# Patient Record
Sex: FEMALE | Race: WHITE | NOT HISPANIC OR LATINO | Employment: FULL TIME | URBAN - NONMETROPOLITAN AREA
[De-identification: names, ages, dates, MRNs, and addresses within clinical notes are randomized per-mention and may not be internally consistent; named-entity substitution may affect disease eponyms.]

---
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
## 2021-05-03 ENCOUNTER — APPOINTMENT (OUTPATIENT)
Dept: GENERAL RADIOLOGY | Facility: HOSPITAL | Age: 21
End: 2021-05-03

## 2021-05-03 ENCOUNTER — HOSPITAL ENCOUNTER (EMERGENCY)
Facility: HOSPITAL | Age: 21
Discharge: HOME | End: 2021-05-03
Attending: EMERGENCY MEDICINE | Admitting: EMERGENCY MEDICINE

## 2021-05-03 VITALS
DIASTOLIC BLOOD PRESSURE: 94 MMHG | WEIGHT: 250 LBS | RESPIRATION RATE: 18 BRPM | SYSTOLIC BLOOD PRESSURE: 152 MMHG | HEART RATE: 89 BPM | TEMPERATURE: 99 F | OXYGEN SATURATION: 100 % | BODY MASS INDEX: 44.3 KG/M2 | HEIGHT: 63 IN

## 2021-05-03 DIAGNOSIS — R07.9 CHEST PAIN, UNSPECIFIED TYPE: ICD-10-CM

## 2021-05-03 DIAGNOSIS — N30.00 ACUTE CYSTITIS WITHOUT HEMATURIA: Primary | ICD-10-CM

## 2021-05-03 LAB
ALBUMIN SERPL-MCNC: 4.45 G/DL (ref 3.5–5.2)
ALBUMIN/GLOB SERPL: 1.4 G/DL
ALP SERPL-CCNC: 73 U/L (ref 39–117)
ALT SERPL W P-5'-P-CCNC: 18 U/L (ref 1–33)
ANION GAP SERPL CALCULATED.3IONS-SCNC: 9 MMOL/L (ref 5–15)
AST SERPL-CCNC: 15 U/L (ref 1–32)
B-HCG UR QL: NEGATIVE
BACTERIA UR QL AUTO: ABNORMAL /HPF
BASOPHILS # BLD AUTO: 0.05 10*3/MM3 (ref 0–0.2)
BASOPHILS NFR BLD AUTO: 0.7 % (ref 0–1.5)
BILIRUB SERPL-MCNC: 0.2 MG/DL (ref 0–1.2)
BILIRUB UR QL STRIP: NEGATIVE
BUN SERPL-MCNC: 12 MG/DL (ref 6–20)
BUN/CREAT SERPL: 14.5 (ref 7–25)
CALCIUM SPEC-SCNC: 9.7 MG/DL (ref 8.6–10.5)
CHLORIDE SERPL-SCNC: 103 MMOL/L (ref 98–107)
CLARITY UR: CLEAR
CO2 SERPL-SCNC: 27 MMOL/L (ref 22–29)
COLOR UR: YELLOW
CREAT SERPL-MCNC: 0.83 MG/DL (ref 0.57–1)
CRP SERPL-MCNC: 0.84 MG/DL (ref 0–0.5)
DEPRECATED RDW RBC AUTO: 40.3 FL (ref 37–54)
EOSINOPHIL # BLD AUTO: 0.19 10*3/MM3 (ref 0–0.4)
EOSINOPHIL NFR BLD AUTO: 2.6 % (ref 0.3–6.2)
ERYTHROCYTE [DISTWIDTH] IN BLOOD BY AUTOMATED COUNT: 13.4 % (ref 12.3–15.4)
FLUAV RNA RESP QL NAA+PROBE: NOT DETECTED
FLUBV RNA RESP QL NAA+PROBE: NOT DETECTED
GFR SERPL CREATININE-BSD FRML MDRD: 88 ML/MIN/1.73
GLOBULIN UR ELPH-MCNC: 3.2 GM/DL
GLUCOSE SERPL-MCNC: 94 MG/DL (ref 65–99)
GLUCOSE UR STRIP-MCNC: NEGATIVE MG/DL
HCT VFR BLD AUTO: 39.5 % (ref 34–46.6)
HGB BLD-MCNC: 12.1 G/DL (ref 12–15.9)
HGB UR QL STRIP.AUTO: ABNORMAL
HOLD SPECIMEN: NORMAL
HOLD SPECIMEN: NORMAL
HYALINE CASTS UR QL AUTO: ABNORMAL /LPF
IMM GRANULOCYTES # BLD AUTO: 0.03 10*3/MM3 (ref 0–0.05)
IMM GRANULOCYTES NFR BLD AUTO: 0.4 % (ref 0–0.5)
KETONES UR QL STRIP: ABNORMAL
LEUKOCYTE ESTERASE UR QL STRIP.AUTO: ABNORMAL
LYMPHOCYTES # BLD AUTO: 1.72 10*3/MM3 (ref 0.7–3.1)
LYMPHOCYTES NFR BLD AUTO: 23.3 % (ref 19.6–45.3)
MCH RBC QN AUTO: 25.5 PG (ref 26.6–33)
MCHC RBC AUTO-ENTMCNC: 30.6 G/DL (ref 31.5–35.7)
MCV RBC AUTO: 83.2 FL (ref 79–97)
MONOCYTES # BLD AUTO: 0.44 10*3/MM3 (ref 0.1–0.9)
MONOCYTES NFR BLD AUTO: 6 % (ref 5–12)
NEUTROPHILS NFR BLD AUTO: 4.94 10*3/MM3 (ref 1.7–7)
NEUTROPHILS NFR BLD AUTO: 67 % (ref 42.7–76)
NITRITE UR QL STRIP: NEGATIVE
NRBC BLD AUTO-RTO: 0 /100 WBC (ref 0–0.2)
PH UR STRIP.AUTO: 6.5 [PH] (ref 5–8)
PLATELET # BLD AUTO: 320 10*3/MM3 (ref 140–450)
PMV BLD AUTO: 10.8 FL (ref 6–12)
POTASSIUM SERPL-SCNC: 4.2 MMOL/L (ref 3.5–5.2)
PROT SERPL-MCNC: 7.6 G/DL (ref 6–8.5)
PROT UR QL STRIP: NEGATIVE
RBC # BLD AUTO: 4.75 10*6/MM3 (ref 3.77–5.28)
RBC # UR: ABNORMAL /HPF
REF LAB TEST METHOD: ABNORMAL
SARS-COV-2 RNA RESP QL NAA+PROBE: NOT DETECTED
SODIUM SERPL-SCNC: 139 MMOL/L (ref 136–145)
SP GR UR STRIP: 1.03 (ref 1–1.03)
SQUAMOUS #/AREA URNS HPF: ABNORMAL /HPF
TROPONIN T SERPL-MCNC: <0.01 NG/ML (ref 0–0.03)
TSH SERPL DL<=0.05 MIU/L-ACNC: 4.15 UIU/ML (ref 0.27–4.2)
UROBILINOGEN UR QL STRIP: ABNORMAL
WBC # BLD AUTO: 7.37 10*3/MM3 (ref 3.4–10.8)
WBC UR QL AUTO: ABNORMAL /HPF
WHOLE BLOOD HOLD SPECIMEN: NORMAL
WHOLE BLOOD HOLD SPECIMEN: NORMAL

## 2021-05-03 PROCEDURE — 80053 COMPREHEN METABOLIC PANEL: CPT | Performed by: PHYSICIAN ASSISTANT

## 2021-05-03 PROCEDURE — 71045 X-RAY EXAM CHEST 1 VIEW: CPT | Performed by: RADIOLOGY

## 2021-05-03 PROCEDURE — 85025 COMPLETE CBC W/AUTO DIFF WBC: CPT | Performed by: PHYSICIAN ASSISTANT

## 2021-05-03 PROCEDURE — 84443 ASSAY THYROID STIM HORMONE: CPT | Performed by: PHYSICIAN ASSISTANT

## 2021-05-03 PROCEDURE — 99283 EMERGENCY DEPT VISIT LOW MDM: CPT

## 2021-05-03 PROCEDURE — 81001 URINALYSIS AUTO W/SCOPE: CPT | Performed by: PHYSICIAN ASSISTANT

## 2021-05-03 PROCEDURE — 25010000002 CEFTRIAXONE PER 250 MG: Performed by: PHYSICIAN ASSISTANT

## 2021-05-03 PROCEDURE — 81025 URINE PREGNANCY TEST: CPT | Performed by: PHYSICIAN ASSISTANT

## 2021-05-03 PROCEDURE — 86140 C-REACTIVE PROTEIN: CPT | Performed by: PHYSICIAN ASSISTANT

## 2021-05-03 PROCEDURE — 87636 SARSCOV2 & INF A&B AMP PRB: CPT | Performed by: EMERGENCY MEDICINE

## 2021-05-03 PROCEDURE — 93005 ELECTROCARDIOGRAM TRACING: CPT | Performed by: PHYSICIAN ASSISTANT

## 2021-05-03 PROCEDURE — 71045 X-RAY EXAM CHEST 1 VIEW: CPT

## 2021-05-03 PROCEDURE — 96372 THER/PROPH/DIAG INJ SC/IM: CPT

## 2021-05-03 PROCEDURE — 84484 ASSAY OF TROPONIN QUANT: CPT | Performed by: PHYSICIAN ASSISTANT

## 2021-05-03 RX ORDER — CEPHALEXIN 500 MG/1
500 CAPSULE ORAL 2 TIMES DAILY
Qty: 14 CAPSULE | Refills: 0 | Status: SHIPPED | OUTPATIENT
Start: 2021-05-03 | End: 2021-05-10

## 2021-05-03 RX ADMIN — LIDOCAINE HYDROCHLORIDE 1 G: 10 INJECTION, SOLUTION EPIDURAL; INFILTRATION; INTRACAUDAL; PERINEURAL at 16:57

## 2021-05-05 LAB
QT INTERVAL: 390 MS
QTC INTERVAL: 432 MS

## 2022-03-05 ENCOUNTER — HOSPITAL ENCOUNTER (EMERGENCY)
Facility: HOSPITAL | Age: 22
Discharge: HOME | End: 2022-03-05
Attending: EMERGENCY MEDICINE | Admitting: EMERGENCY MEDICINE

## 2022-03-05 ENCOUNTER — APPOINTMENT (OUTPATIENT)
Dept: GENERAL RADIOLOGY | Facility: HOSPITAL | Age: 22
End: 2022-03-05

## 2022-03-05 VITALS
HEIGHT: 63 IN | SYSTOLIC BLOOD PRESSURE: 122 MMHG | BODY MASS INDEX: 40.75 KG/M2 | RESPIRATION RATE: 16 BRPM | OXYGEN SATURATION: 99 % | DIASTOLIC BLOOD PRESSURE: 79 MMHG | HEART RATE: 96 BPM | TEMPERATURE: 98.2 F | WEIGHT: 230 LBS

## 2022-03-05 DIAGNOSIS — L23.7 POISON IVY: ICD-10-CM

## 2022-03-05 DIAGNOSIS — T14.8XXA ANIMAL BITES: Primary | ICD-10-CM

## 2022-03-05 PROCEDURE — 25010000002 TETANUS-DIPHTH-ACELL PERTUSSIS 5-2.5-18.5 LF-MCG/0.5 SUSPENSION PREFILLED SYRINGE: Performed by: PHYSICIAN ASSISTANT

## 2022-03-05 PROCEDURE — 99283 EMERGENCY DEPT VISIT LOW MDM: CPT

## 2022-03-05 PROCEDURE — 73130 X-RAY EXAM OF HAND: CPT

## 2022-03-05 PROCEDURE — 73130 X-RAY EXAM OF HAND: CPT | Performed by: RADIOLOGY

## 2022-03-05 PROCEDURE — 90715 TDAP VACCINE 7 YRS/> IM: CPT | Performed by: PHYSICIAN ASSISTANT

## 2022-03-05 PROCEDURE — 90471 IMMUNIZATION ADMIN: CPT | Performed by: PHYSICIAN ASSISTANT

## 2022-03-05 RX ORDER — AMOXICILLIN AND CLAVULANATE POTASSIUM 875; 125 MG/1; MG/1
1 TABLET, FILM COATED ORAL EVERY 12 HOURS
Qty: 20 TABLET | Refills: 0 | Status: SHIPPED | OUTPATIENT
Start: 2022-03-05

## 2022-03-05 RX ORDER — IBUPROFEN 600 MG/1
600 TABLET ORAL EVERY 6 HOURS PRN
Qty: 20 TABLET | Refills: 0 | Status: SHIPPED | OUTPATIENT
Start: 2022-03-05

## 2022-03-05 RX ORDER — TRIAMCINOLONE ACETONIDE 1 MG/G
1 CREAM TOPICAL 3 TIMES DAILY
Qty: 30 G | Refills: 0 | Status: SHIPPED | OUTPATIENT
Start: 2022-03-05

## 2022-03-05 RX ADMIN — IBUPROFEN 600 MG: 400 TABLET, FILM COATED ORAL at 10:04

## 2022-03-05 RX ADMIN — TETANUS TOXOID, REDUCED DIPHTHERIA TOXOID AND ACELLULAR PERTUSSIS VACCINE, ADSORBED 0.5 ML: 5; 2.5; 8; 8; 2.5 SUSPENSION INTRAMUSCULAR at 10:05

## 2022-03-05 NOTE — ED PROVIDER NOTES
Subjective   22-year-old white female presents secondary to dog bite.  Patient was at her home and her dog is in heat.  Patient had a unknown dog that she is never seen before, and attacked her dog.  She states that she was trying to separate these 2 dogs when she sustained a bite to her left hand and her right posterior calf.  She is not up-to-date on her tetanus shot.  She states that she is never seen this dog before.  She has no idea if it is up-to-date on shots and will likely never see this dog again.  The dog was vicious.  After reviewing Dr James.  He feels that it is safest if patient pursues with rabies shots.          Review of Systems   Constitutional: Negative.  Negative for fever.   HENT: Negative.    Respiratory: Negative.    Cardiovascular: Negative.  Negative for chest pain.   Gastrointestinal: Negative.  Negative for abdominal pain.   Endocrine: Negative.    Genitourinary: Negative.  Negative for dysuria.   Skin: Negative.    Neurological: Negative.    Psychiatric/Behavioral: Negative.    All other systems reviewed and are negative.      No past medical history on file.    No Known Allergies    No past surgical history on file.    No family history on file.    Social History     Socioeconomic History   • Marital status: Single           Objective   Physical Exam  Vitals and nursing note reviewed.   Constitutional:       General: She is not in acute distress.     Appearance: She is well-developed. She is not diaphoretic.   HENT:      Head: Normocephalic and atraumatic.      Right Ear: External ear normal.      Left Ear: External ear normal.      Nose: Nose normal.   Eyes:      Conjunctiva/sclera: Conjunctivae normal.      Pupils: Pupils are equal, round, and reactive to light.   Neck:      Vascular: No JVD.      Trachea: No tracheal deviation.   Cardiovascular:      Rate and Rhythm: Normal rate and regular rhythm.      Heart sounds: Normal heart sounds. No murmur heard.  Pulmonary:      Effort:  Pulmonary effort is normal. No respiratory distress.      Breath sounds: Normal breath sounds. No wheezing.   Abdominal:      General: Bowel sounds are normal.      Palpations: Abdomen is soft.      Tenderness: There is no abdominal tenderness.   Musculoskeletal:         General: No deformity. Normal range of motion.      Cervical back: Normal range of motion and neck supple.   Skin:     General: Skin is warm and dry.      Coloration: Skin is not pale.      Findings: No erythema or rash.      Comments: 2 small puncture wounds to patient's dorsal left hand.  These are less than 1/2 cm each.  She does have some swelling to this area.  Patient also has a laceration/tear that is approximately 1 cm long superficial on her right posterior calf.  No active bleeding from any sites.  States this area on her leg is not as tender as her hand.   Neurological:      Mental Status: She is alert and oriented to person, place, and time.      Cranial Nerves: No cranial nerve deficit.   Psychiatric:         Behavior: Behavior normal.         Thought Content: Thought content normal.         Procedures           ED Course  ED Course as of 03/05/22 1420   Sat Mar 05, 2022   1107 I had ordered rabies vaccines based on the fact that this was an unknown dog's vaccination status is unknown.  Patient has refused.  She has been counseled that rabies is potentially deadly.  She was counseled that she should follow-up with the  to try to find this dog so it can be monitored.  She was made aware that rabies is a deadly disease.  She wishes to forego the shots at this time.  She is awake alert and oriented capable of making this decision. [JI]      ED Course User Index  [JI] Dylan Kay PA                                                 MDM  Number of Diagnoses or Management Options  Animal bites: new and requires workup  Poison ivy: new and does not require workup     Amount and/or Complexity of Data Reviewed  Tests in  the radiology section of CPT®: ordered and reviewed        Final diagnoses:   Animal bites   Poison ivy       ED Disposition  ED Disposition     ED Disposition   Discharge    Condition   Stable    Comment   --             Dylan Bojorquez PA  [address] [facility] [address]  [phone]    In 2 days      [facility] Emergency Department  [address] [facility] [id number] [phone]    If symptoms worsen         Medication List      New Prescriptions    amoxicillin-clavulanate 875-125 MG per tablet  Commonly known as: AUGMENTIN  Take 1 tablet by mouth Every 12 (Twelve) Hours.     ibuprofen 600 MG tablet  Commonly known as: ADVIL,MOTRIN  Take 1 tablet by mouth Every 6 (Six) Hours As Needed for Mild Pain .     triamcinolone 0.1 % cream  Commonly known as: KENALOG  Apply 1 application topically to the appropriate area as directed 3 (Three) Times a Day.        Stop    hydrocortisone 1 % lotion           Where to Get Your Medications      You can get these medications from any pharmacy    Bring a paper prescription for each of these medications  · amoxicillin-clavulanate 875-125 MG per tablet  · ibuprofen 600 MG tablet  · triamcinolone 0.1 % cream          Dylan Kay PA  03/05/22 7965

## 2022-03-05 NOTE — ED NOTES
MEDICAL SCREENING:    Reason for Visit: Dog bite to left hand and right lower leg.    Patient initially seen in triage.  The patient was advised further evaluation and diagnostic testing will be needed, some of the treatment and testing will be initiated in the lobby in order to begin the process.  The patient will be returned to the waiting area for the time being and possibly be re-assessed by a subsequent ED provider.  The patient will be brought back to the treatment area in as timely manner as possible.         Dylan Kay PA  03/05/22 0818

## 2022-03-05 NOTE — ED NOTES
"WOUND DRESSING: Wounds on both front/back of left hand and back of right leg cleaned and debrided with Hibiclens and saline solution. Wound dressed with nonadherent pad and 3\" Cira wrap. Patient advised to keep wound covered for a period of 24 hours; patient advised to keep wound clean with warm soapy water and to apply neosporin as needed.      Evan Olmedo, PCT  03/05/22 1117    "

## 2022-03-05 NOTE — EXTERNAL PATIENT INSTRUCTIONS
Patient Education   Table of Contents       Animal Bite, Adult       Poison Ivy Dermatitis       Rabies     To view videos and all your education online visit,   [email or web address]   or scan this QR code with your smartphone.                  Animal Bite, Adult     Animal bites range from mild to serious. An animal bite can result in any of these injuries:       A scratch.       A deep, open cut.       A puncture of the skin.       A crush injury.       Tearing away of the skin or a body part.       A bone injury.     A small bite from a house pet is usually less serious than a bite from a stray or wild animal, such as a raccoon, yañez, skunk, or bat. That is because stray and wild animals have a higher risk of carrying a serious infection called rabies, which can be passed to humans through a bite.   What increases the risk?    You are more likely to be bitten by an animal if:       You are around unfamiliar pets.       You disturb an animal when it is eating, sleeping, or caring for its babies.       You are outdoors in a place where small, wild animals roam freely.     What are the signs or symptoms?    Common symptoms of an animal bite include:       Pain.       Bleeding.       Swelling.       Bruising.     How is this diagnosed?    This condition may be diagnosed based on a physical exam and medical history. Your health care provider will examine your wound and ask for details about the animal and how the bite happened. You may also have tests, such as:       Blood tests to check for infection.       X-rays to check for damage to bones or joints.       Taking a fluid sample from your wound and checking it for infection (culture test).     How is this treated?    Treatment varies depending on the type of animal, where the bite is on your body, and your medical history. Treatment may include:       Caring for the wound. This often includes cleaning the wound, rinsing out (flushing) the wound with  saline solution, and applying a bandage (dressing). In some cases, the wound may be closed with stitches (sutures), staples, skin glue, or adhesive strips.       Antibiotic medicine to prevent or treat infection. This medicine may be prescribed in pill or ointment form. If the bite area becomes infected, the medicine may be given through an IV.       A tetanus shot to prevent tetanus infection.       Rabies treatment to prevent rabies infection. This will be done if the animal could have rabies.       Surgery. This may be done if a bite gets infected or if there is damage that needs to be repaired.     Follow these instructions at home:   Wound care           Follow instructions from your health care provider about how to take care of your wound. Make sure you:       Wash your hands with soap and water before you change your dressing. If soap and water are not available, use hand .       Change your dressing as told by your health care provider.       Leave sutures, skin glue, or adhesive strips in place. These skin closures may need to stay in place for 2 weeks or longer. If adhesive strip edges start to loosen and curl up, you may trim the loose edges. Do not  remove adhesive strips completely unless your health care provider tells you to do that.      Check your wound every day for signs of infection. Check for:       More redness, swelling, or pain.       More fluid or blood.       Warmth.       Pus or a bad smell.     Medicines         Take or apply over-the-counter and prescription medicines only as told by your health care provider.       If you were prescribed an antibiotic, take or apply it as told by your health care provider. Do not  stop using the antibiotic even if your condition improves.     General instructions            Keep the injured area raised (elevated) above the level of your heart while you are sitting or lying down, if this is possible.      If directed, put ice on the injured  area.       Put ice in a plastic bag.       Place a towel between your skin and the bag.       Leave the ice on for 20 minutes, 2?3 times per day.       Keep all follow-up visits as told by your health care provider. This is important.         Contact a health care provider if:         You have more redness, swelling, or pain around your wound.       Your wound feels warm to the touch.       You have a fever or chills.       You have a general feeling of sickness (malaise).       You feel nauseous or you vomit.       You have pain that does not get better.     Get help right away if:         You have a red streak that leads away from your wound.       You have non-clear fluid or more blood coming from your wound.       There is pus or a bad smell coming from your wound.       You have trouble moving your injured area.       You have numbness or tingling that extends beyond the wound.     Summary         Animal bites can range from mild to serious. An animal bite can cause a scratch on the skin, a deep open cut, a puncture of the skin, a crush injury, tearing away of the skin or a body part, or a bone injury.       Your health care provider will examine your wound and ask for details about the animal and how the bite happened.       You may also have tests such as a blood test, X-ray, or testing of a fluid sample from your wound (culture test).       Treatment may include wound care, antibiotic medicine, a tetanus shot, and rabies treatment if the animal could have rabies.     This information is not intended to replace advice given to you by your health care provider. Make sure you discuss any questions you have with your health care provider.     Document Released: 09/04/2012Document Revised: 10/12/2021Document Reviewed: 10/12/2021     Elsevier Patient Education ? 2021 [employer]         Poison Ivy Dermatitis     Poison ivy dermatitis is inflammation of the skin that is caused by chemicals in the leaves of the  poison ivy plant. The skin reaction often involves redness, swelling, blisters, and extreme itching.   What are the causes?    This condition is caused by a chemical (urushiol) found in the sap of the poison ivy plant. This chemical is sticky and can be easily spread to people, animals, and objects. You can get poison ivy dermatitis by:       Having direct contact with a poison ivy plant.       Touching animals, other people, or objects that have come in contact with poison ivy and have the chemical on them.     What increases the risk?    This condition is more likely to develop in people who:       Are outdoors often in wooded or marshy areas.       Go outdoors without wearing protective clothing, such as closed shoes, long pants, and a long-sleeved shirt.     What are the signs or symptoms?       Symptoms of this condition include:       Redness of the skin.       Extreme itching.       A rash that often includes bumps and blisters. The rash usually appears 48 hours after exposure, if you have been exposed before. If this is the first time you have been exposed, the rash may not appear until a week after exposure.       Swelling. This may occur if the reaction is more severe.     Symptoms usually last for 1?2 weeks. However, the first time you develop this condition, symptoms may last 3?4 weeks.     How is this diagnosed?   This condition may be diagnosed based on your symptoms and a physical exam. Your health care provider may also ask you about any recent outdoor activity.   How is this treated?    Treatment for this condition will vary depending on how severe it is. Treatment may include:       Hydrocortisone cream or calamine lotion to relieve itching.       Oatmeal baths to soothe the skin.       Medicines, such as over-the-counter antihistamine tablets.       Oral steroid medicine, for more severe reactions.     Follow these instructions at home:   Medicines         Take or apply over-the-counter and  prescription medicines only as told by your health care provider.       Use hydrocortisone cream or calamine lotion as needed to soothe the skin and relieve itching.     General instructions        Do not  scratch or rub your skin.       Apply a cold, wet cloth (cold compress) to the affected areas or take baths in cool water. This will help with itching. Avoid hot baths and showers.       Take oatmeal baths as needed. Use colloidal oatmeal. You can get this at your local pharmacy or grocery store. Follow the instructions on the packaging.       While you have the rash, wash clothes right after you wear them.       Keep all follow-up visits as told by your health care provider. This is important.     How is this prevented?            Learn to identify the poison ivy plant and avoid contact with the plant. This plant can be recognized by the number of leaves. Generally, poison ivy has three leaves with flowering branches on a single stem. The leaves are typically glossy, and they have jagged edges that come to a point at the front.       If you have been exposed to poison ivy, thoroughly wash with soap and water right away. You have about 30 minutes to remove the plant resin before it will cause the rash. Be sure to wash under your fingernails, because any plant resin there will continue to spread the rash.       When hiking or camping, wear clothes that will help you to avoid exposure on the skin. This includes long pants, a long-sleeved shirt, tall socks, and hiking boots. You can also apply preventive lotion to your skin to help limit exposure.       If you suspect that your clothes or outdoor gear came in contact with poison ivy, rinse them off outside with a garden hose before you bring them inside your house.       When doing yard work or gardening, wear gloves, long sleeves, long pants, and boots. Wash your garden tools and gloves if they come in contact with poison ivy.       If you suspect that your pet has  come into contact with poison ivy, wash him or her with pet shampoo and water. Make sure to wear gloves while washing your pet.       Contact a health care provider if you have:         Open sores in the rash area.       More redness, swelling, or pain in the affected area.       Redness that spreads beyond the rash area.       Fluid, blood, or pus coming from the affected area.       A fever.       A rash over a large area of your body.       A rash on your eyes, mouth, or genitals.       A rash that does not improve after a few weeks.     Get help right away if:         Your face swells or your eyes swell shut.       You have trouble breathing.       You have trouble swallowing.     These symptoms may represent a serious problem that is an emergency. Do not wait to see if the symptoms will go away. Get medical help right away. Call your local emergency services (911 in the [place] Do not drive yourself to the hospital.   Summary         Poison ivy dermatitis is inflammation of the skin that is caused by chemicals in the leaves of the poison ivy plant.       Symptoms of this condition include redness, itching, a rash, and swelling.      Do not  scratch or rub your skin.       Take or apply over-the-counter and prescription medicines only as told by your health care provider.     This information is not intended to replace advice given to you by your health care provider. Make sure you discuss any questions you have with your health care provider.     Document Released: 2001Document Revised: 04/10/2020Document Reviewed: 12/13/2019     Elsevier Patient Education ? 2021 [employer]         Rabies     Rabies is an infection that affects the brain and central nervous system. It is caused by a virus that can be carried by many kinds of animals. The virus can spread from an infected animal to a person through a bite.   If you have been bitten by an animal with rabies, it is very important that you get treatment  right away. Infection almost always results in death, but early treatment may prevent an infection from developing.   What are the causes?   This condition is caused by a virus that can be carried by many kinds of animals, including dogs, cats, skunks, bats, woodchucks, raccoons, coyotes, and foxes. The virus spreads through the saliva of infected animals. Most people who get rabies get it from an animal bite.   What are the signs or symptoms?    Symptoms of this condition usually start 1?3 months after you are bitten. By the time symptoms start, it is usually too late for lifesaving treatment. Symptoms may include:       Headache.       Fever.       Fatigue and weakness.       Agitation.       Anxiety.       Confusion.       Unusual behavior, such as hyperactivity, fear of water (hydrophobia), or fear of air (aerophobia).       Hallucinations.       Insomnia.       Weakness in the arms or legs.       Difficulty swallowing.     Most people who are treated right away will never have symptoms.   How is this diagnosed?    This condition may be diagnosed based on:       Your history of exposure to animals and animal bites.       Your symptoms.       Saliva tests.       Blood tests.       Skin test. Skin samples are taken with a needle.       Spinal fluid test. Spinal fluid samples are taken with a needle that is inserted into your back (lumbar puncture).     How is this treated?       Treatment is often started right away, even if it is not known for sure if the animal that bit you has rabies. Treatment, called post-exposure prophylaxis (PEP), aims to prevent the infection from developing. It involves:       Cleaning the wound.       Getting an injection to strengthen your body's defense against the rabies virus (immune globulin).       Having a series of rabies vaccine injections, usually given over a 2-week period.     If the animal that bit you has been caught and is alive, it will be watched to see if it remains  healthy. If the animal has been killed, it can be tested for rabies.     Follow these instructions at home:   Caring for your injury           Follow instructions from your health care provider about how to take care of your wound. Make sure you:       Wash your hands with soap and water before you change your bandage (dressing). If soap and water are not available, use hand .       Change your dressing as told by your health care provider.       Leave stitches (sutures), skin glue, or adhesive strips in place. These skin closures may need to stay in place for 2 weeks or longer. If adhesive strip edges start to loosen and curl up, you may trim the loose edges. Do not  remove adhesive strips completely unless your health care provider tells you to do that.      Check your wound every day for signs of infection. Check for:       More redness, swelling, or pain.       Fluid or blood.       Pus or a bad smell.       Warmth.       Keep the wound dry for as long as told by your health care provider.       Keep the wound raised (elevated) above the level of your heart as much as possible.       Rest the injured area. Do not  use the injured area until your health care provider says it is okay.     General instructions         If the animal that bit you was tested for rabies, ask your health care provider or the department performing the test, when the test results will be ready. It is your responsibility to get the test results.       Take over-the-counter and prescription medicines only as told by your health care provider.       Keep all follow-up visits as told by your health care provider. This is important.       How is this prevented?         Stay away from stray or wild animals.      Get the rabies vaccine if you:       Plan to travel to an area where rabies is common.       Have a job or hobbies that involve possible contact with wild or stray animals.       Make sure your pet stays up to date with rabies  vaccinations.       Watch your pets when they are outside. Keep them away from wild animals.       Report any stray animals to the local animal control services.     Get help right away if you:         Are bitten by a wild or stray animal.       Have had any direct exposure to a bat.       Have any symptoms of rabies infection.      Have signs that your wound is infected, including:       More redness, swelling, or pain around your wound.       Fluid or blood coming from your wound.       Pus or a bad smell coming from your wound.       Your wound feels warm to the touch.     Summary         Rabies is an infection that affects the brain and central nervous system.       This condition is caused by a virus that can be carried by many kinds of animals, including dogs, cats, skunks, bats, woodchucks, raccoons, coyotes, and foxes.       The virus can spread from an infected animal's saliva to a person through a bite.       If you are bitten, get treatment right away. This may prevent an infection from developing. Symptoms of an infection usually do not start until 1?3 months after you are bitten. By then it may be too late for lifesaving treatment.     This information is not intended to replace advice given to you by your health care provider. Make sure you discuss any questions you have with your health care provider.     Document Released: 12/18/2006Document Revised: 01/23/2019Document Reviewed: 01/23/2019     [employer] Patient Education ? 2021 [email or web address]

## 2023-08-17 ENCOUNTER — APPOINTMENT (OUTPATIENT)
Dept: GENERAL RADIOLOGY | Facility: HOSPITAL | Age: 23
End: 2023-08-17

## 2023-08-17 ENCOUNTER — APPOINTMENT (OUTPATIENT)
Dept: CT IMAGING | Facility: HOSPITAL | Age: 23
End: 2023-08-17

## 2023-08-17 ENCOUNTER — HOSPITAL ENCOUNTER (EMERGENCY)
Facility: HOSPITAL | Age: 23
Discharge: HOME | End: 2023-08-17
Attending: STUDENT IN AN ORGANIZED HEALTH CARE EDUCATION/TRAINING PROGRAM

## 2023-08-17 VITALS
SYSTOLIC BLOOD PRESSURE: 146 MMHG | HEIGHT: 63 IN | HEART RATE: 70 BPM | DIASTOLIC BLOOD PRESSURE: 70 MMHG | TEMPERATURE: 98.6 F | OXYGEN SATURATION: 99 % | RESPIRATION RATE: 18 BRPM | WEIGHT: 250 LBS | BODY MASS INDEX: 44.3 KG/M2

## 2023-08-17 DIAGNOSIS — R42 DIZZINESS: Primary | ICD-10-CM

## 2023-08-17 LAB
ALBUMIN SERPL-MCNC: 4.4 G/DL (ref 3.5–5.2)
ALBUMIN/GLOB SERPL: 1.4 G/DL
ALP SERPL-CCNC: 73 U/L (ref 39–117)
ALT SERPL W P-5'-P-CCNC: 15 U/L (ref 1–33)
ANION GAP SERPL CALCULATED.3IONS-SCNC: 10 MMOL/L (ref 5–15)
AST SERPL-CCNC: 13 U/L (ref 1–32)
B-HCG UR QL: NEGATIVE
BACTERIA UR QL AUTO: ABNORMAL /HPF
BASOPHILS # BLD AUTO: 0.04 10*3/MM3 (ref 0–0.2)
BASOPHILS NFR BLD AUTO: 0.5 % (ref 0–1.5)
BILIRUB SERPL-MCNC: 0.3 MG/DL (ref 0–1.2)
BILIRUB UR QL STRIP: NEGATIVE
BUN SERPL-MCNC: 14 MG/DL (ref 6–20)
BUN/CREAT SERPL: 16.5 (ref 7–25)
CALCIUM SPEC-SCNC: 9.6 MG/DL (ref 8.6–10.5)
CHLORIDE SERPL-SCNC: 102 MMOL/L (ref 98–107)
CLARITY UR: ABNORMAL
CO2 SERPL-SCNC: 24 MMOL/L (ref 22–29)
COLOR UR: YELLOW
CREAT SERPL-MCNC: 0.85 MG/DL (ref 0.57–1)
DEPRECATED RDW RBC AUTO: 41.1 FL (ref 37–54)
EGFRCR SERPLBLD CKD-EPI 2021: 99.5 ML/MIN/1.73
EOSINOPHIL # BLD AUTO: 0.41 10*3/MM3 (ref 0–0.4)
EOSINOPHIL NFR BLD AUTO: 5 % (ref 0.3–6.2)
ERYTHROCYTE [DISTWIDTH] IN BLOOD BY AUTOMATED COUNT: 13.5 % (ref 12.3–15.4)
GLOBULIN UR ELPH-MCNC: 3.2 GM/DL
GLUCOSE SERPL-MCNC: 99 MG/DL (ref 65–99)
GLUCOSE UR STRIP-MCNC: NEGATIVE MG/DL
HCT VFR BLD AUTO: 39.9 % (ref 34–46.6)
HGB BLD-MCNC: 12.2 G/DL (ref 12–15.9)
HGB UR QL STRIP.AUTO: NEGATIVE
HOLD SPECIMEN: NORMAL
HOLD SPECIMEN: NORMAL
HYALINE CASTS UR QL AUTO: ABNORMAL /LPF
IMM GRANULOCYTES # BLD AUTO: 0.03 10*3/MM3 (ref 0–0.05)
IMM GRANULOCYTES NFR BLD AUTO: 0.4 % (ref 0–0.5)
KETONES UR QL STRIP: NEGATIVE
LEUKOCYTE ESTERASE UR QL STRIP.AUTO: ABNORMAL
LYMPHOCYTES # BLD AUTO: 1.84 10*3/MM3 (ref 0.7–3.1)
LYMPHOCYTES NFR BLD AUTO: 22.3 % (ref 19.6–45.3)
MAGNESIUM SERPL-MCNC: 2.2 MG/DL (ref 1.6–2.6)
MCH RBC QN AUTO: 25.6 PG (ref 26.6–33)
MCHC RBC AUTO-ENTMCNC: 30.6 G/DL (ref 31.5–35.7)
MCV RBC AUTO: 83.6 FL (ref 79–97)
MONOCYTES # BLD AUTO: 0.55 10*3/MM3 (ref 0.1–0.9)
MONOCYTES NFR BLD AUTO: 6.7 % (ref 5–12)
NEUTROPHILS NFR BLD AUTO: 5.37 10*3/MM3 (ref 1.7–7)
NEUTROPHILS NFR BLD AUTO: 65.1 % (ref 42.7–76)
NITRITE UR QL STRIP: NEGATIVE
NRBC BLD AUTO-RTO: 0 /100 WBC (ref 0–0.2)
PH UR STRIP.AUTO: 6 [PH] (ref 5–8)
PLATELET # BLD AUTO: 292 10*3/MM3 (ref 140–450)
PMV BLD AUTO: 11.6 FL (ref 6–12)
POTASSIUM SERPL-SCNC: 4.2 MMOL/L (ref 3.5–5.2)
PROT SERPL-MCNC: 7.6 G/DL (ref 6–8.5)
PROT UR QL STRIP: NEGATIVE
QT INTERVAL: 378 MS
QTC INTERVAL: 408 MS
RBC # BLD AUTO: 4.77 10*6/MM3 (ref 3.77–5.28)
RBC # UR STRIP: ABNORMAL /HPF
REF LAB TEST METHOD: ABNORMAL
SODIUM SERPL-SCNC: 136 MMOL/L (ref 136–145)
SP GR UR STRIP: 1.02 (ref 1–1.03)
SQUAMOUS #/AREA URNS HPF: ABNORMAL /HPF
TROPONIN T SERPL HS-MCNC: <6 NG/L
UROBILINOGEN UR QL STRIP: ABNORMAL
WBC # UR STRIP: ABNORMAL /HPF
WBC NRBC COR # BLD: 8.24 10*3/MM3 (ref 3.4–10.8)
WHOLE BLOOD HOLD COAG: NORMAL
WHOLE BLOOD HOLD SPECIMEN: NORMAL

## 2023-08-17 PROCEDURE — 85025 COMPLETE CBC W/AUTO DIFF WBC: CPT | Performed by: PHYSICIAN ASSISTANT

## 2023-08-17 PROCEDURE — 80053 COMPREHEN METABOLIC PANEL: CPT | Performed by: PHYSICIAN ASSISTANT

## 2023-08-17 PROCEDURE — 71045 X-RAY EXAM CHEST 1 VIEW: CPT | Performed by: RADIOLOGY

## 2023-08-17 PROCEDURE — 83735 ASSAY OF MAGNESIUM: CPT | Performed by: PHYSICIAN ASSISTANT

## 2023-08-17 PROCEDURE — 93005 ELECTROCARDIOGRAM TRACING: CPT | Performed by: PHYSICIAN ASSISTANT

## 2023-08-17 PROCEDURE — 84484 ASSAY OF TROPONIN QUANT: CPT | Performed by: PHYSICIAN ASSISTANT

## 2023-08-17 PROCEDURE — 70450 CT HEAD/BRAIN W/O DYE: CPT | Performed by: RADIOLOGY

## 2023-08-17 PROCEDURE — 99284 EMERGENCY DEPT VISIT MOD MDM: CPT

## 2023-08-17 PROCEDURE — 81025 URINE PREGNANCY TEST: CPT | Performed by: PHYSICIAN ASSISTANT

## 2023-08-17 PROCEDURE — 93010 ELECTROCARDIOGRAM REPORT: CPT | Performed by: INTERNAL MEDICINE

## 2023-08-17 PROCEDURE — 70450 CT HEAD/BRAIN W/O DYE: CPT

## 2023-08-17 PROCEDURE — 36415 COLL VENOUS BLD VENIPUNCTURE: CPT

## 2023-08-17 PROCEDURE — 81001 URINALYSIS AUTO W/SCOPE: CPT | Performed by: PHYSICIAN ASSISTANT

## 2023-08-17 PROCEDURE — 71045 X-RAY EXAM CHEST 1 VIEW: CPT

## 2023-08-17 RX ORDER — SODIUM CHLORIDE 0.9 % (FLUSH) 0.9 %
10 SYRINGE (ML) INJECTION AS NEEDED
Status: DISCONTINUED | OUTPATIENT
Start: 2023-08-17 | End: 2023-08-17 | Stop reason: HOSPADM

## 2023-08-17 NOTE — ED PROVIDER NOTES
Subjective   History of Present Illness  This is a 23 year old female patient who presents to the ER with chief complaint of dizziness. PMH significant for migraines. For the past 3 days, the patient has had intermittent episodes of head spinning dizziness. She denies associated headache, numbness/tingling, chest pain and SOB.     Review of Systems   Constitutional: Negative.  Negative for fever.   HENT: Negative.     Respiratory: Negative.     Cardiovascular: Negative.  Negative for chest pain.   Gastrointestinal: Negative.  Negative for abdominal pain.   Endocrine: Negative.    Genitourinary: Negative.  Negative for dysuria.   Skin: Negative.    Neurological:  Positive for dizziness. Negative for tremors, seizures, syncope, facial asymmetry, speech difficulty, weakness, light-headedness, numbness and headaches.   Psychiatric/Behavioral: Negative.     All other systems reviewed and are negative.    No past medical history on file.    No Known Allergies    No past surgical history on file.    No family history on file.    Social History     Socioeconomic History    Marital status: Single           Objective   Physical Exam  Vitals and nursing note reviewed.   Constitutional:       General: She is not in acute distress.     Appearance: She is well-developed. She is not diaphoretic.   HENT:      Head: Normocephalic and atraumatic.      Right Ear: External ear normal.      Left Ear: External ear normal.      Nose: Nose normal.   Eyes:      Conjunctiva/sclera: Conjunctivae normal.      Pupils: Pupils are equal, round, and reactive to light.   Neck:      Vascular: No JVD.      Trachea: No tracheal deviation.   Cardiovascular:      Rate and Rhythm: Normal rate and regular rhythm.      Heart sounds: Normal heart sounds. No murmur heard.  Pulmonary:      Effort: Pulmonary effort is normal. No respiratory distress.      Breath sounds: Normal breath sounds. No wheezing.   Abdominal:      General: Bowel sounds are normal.       Palpations: Abdomen is soft.      Tenderness: There is no abdominal tenderness.   Musculoskeletal:         General: No deformity. Normal range of motion.      Cervical back: Normal range of motion and neck supple.   Skin:     General: Skin is warm and dry.      Coloration: Skin is not pale.      Findings: No erythema or rash.   Neurological:      General: No focal deficit present.      Mental Status: She is alert and oriented to person, place, and time. Mental status is at baseline.      Cranial Nerves: No cranial nerve deficit.      Sensory: No sensory deficit.      Motor: No weakness.      Coordination: Coordination normal.      Gait: Gait normal.      Deep Tendon Reflexes: Reflexes normal.   Psychiatric:         Behavior: Behavior normal.         Thought Content: Thought content normal.       Procedures       Results for orders placed or performed during the hospital encounter of 08/17/23   Comprehensive Metabolic Panel    Specimen: Blood   Result Value Ref Range    Glucose 99 65 - 99 mg/dL    BUN 14 6 - 20 mg/dL    Creatinine 0.85 0.57 - 1.00 mg/dL    Sodium 136 136 - 145 mmol/L    Potassium 4.2 3.5 - 5.2 mmol/L    Chloride 102 98 - 107 mmol/L    CO2 24.0 22.0 - 29.0 mmol/L    Calcium 9.6 8.6 - 10.5 mg/dL    Total Protein 7.6 6.0 - 8.5 g/dL    Albumin 4.4 3.5 - 5.2 g/dL    ALT (SGPT) 15 1 - 33 U/L    AST (SGOT) 13 1 - 32 U/L    Alkaline Phosphatase 73 39 - 117 U/L    Total Bilirubin 0.3 0.0 - 1.2 mg/dL    Globulin 3.2 gm/dL    A/G Ratio 1.4 g/dL    BUN/Creatinine Ratio 16.5 7.0 - 25.0    Anion Gap 10.0 5.0 - 15.0 mmol/L    eGFR 99.5 >60.0 mL/min/1.73   Pregnancy, Urine - Urine, Clean Catch    Specimen: Urine, Clean Catch   Result Value Ref Range    HCG, Urine QL Negative Negative   Urinalysis With Microscopic If Indicated (No Culture) - Urine, Clean Catch    Specimen: Urine, Clean Catch   Result Value Ref Range    Color, UA Yellow Yellow, Straw    Appearance, UA Cloudy (A) Clear    pH, UA 6.0 5.0 - 8.0     Specific Gravity, UA 1.021 1.005 - 1.030    Glucose, UA Negative Negative    Ketones, UA Negative Negative    Bilirubin, UA Negative Negative    Blood, UA Negative Negative    Protein, UA Negative Negative    Leuk Esterase, UA Small (1+) (A) Negative    Nitrite, UA Negative Negative    Urobilinogen, UA 0.2 E.U./dL 0.2 - 1.0 E.U./dL   Magnesium    Specimen: Blood   Result Value Ref Range    Magnesium 2.2 1.6 - 2.6 mg/dL   CBC Auto Differential    Specimen: Blood   Result Value Ref Range    WBC 8.24 3.40 - 10.80 10*3/mm3    RBC 4.77 3.77 - 5.28 10*6/mm3    Hemoglobin 12.2 12.0 - 15.9 g/dL    Hematocrit 39.9 34.0 - 46.6 %    MCV 83.6 79.0 - 97.0 fL    MCH 25.6 (L) 26.6 - 33.0 pg    MCHC 30.6 (L) 31.5 - 35.7 g/dL    RDW 13.5 12.3 - 15.4 %    RDW-SD 41.1 37.0 - 54.0 fl    MPV 11.6 6.0 - 12.0 fL    Platelets 292 140 - 450 10*3/mm3    Neutrophil % 65.1 42.7 - 76.0 %    Lymphocyte % 22.3 19.6 - 45.3 %    Monocyte % 6.7 5.0 - 12.0 %    Eosinophil % 5.0 0.3 - 6.2 %    Basophil % 0.5 0.0 - 1.5 %    Immature Grans % 0.4 0.0 - 0.5 %    Neutrophils, Absolute 5.37 1.70 - 7.00 10*3/mm3    Lymphocytes, Absolute 1.84 0.70 - 3.10 10*3/mm3    Monocytes, Absolute 0.55 0.10 - 0.90 10*3/mm3    Eosinophils, Absolute 0.41 (H) 0.00 - 0.40 10*3/mm3    Basophils, Absolute 0.04 0.00 - 0.20 10*3/mm3    Immature Grans, Absolute 0.03 0.00 - 0.05 10*3/mm3    nRBC 0.0 0.0 - 0.2 /100 WBC   Single High Sensitivity Troponin T    Specimen: Blood   Result Value Ref Range    HS Troponin T <6 <10 ng/L   Urinalysis, Microscopic Only - Urine, Clean Catch    Specimen: Urine, Clean Catch   Result Value Ref Range    RBC, UA 0-2 None Seen, 0-2 /HPF    WBC, UA 6-12 (A) None Seen, 0-2 /HPF    Bacteria, UA None Seen None Seen /HPF    Squamous Epithelial Cells, UA 0-2 None Seen, 0-2 /HPF    Hyaline Casts, UA None Seen None Seen /LPF    Methodology Automated Microscopy    ECG 12 Lead Other; dizziness   Result Value Ref Range    QT Interval 378 ms    QTC Interval 408  ms   Green Top (Gel)   Result Value Ref Range    Extra Tube Hold for add-ons.    Lavender Top   Result Value Ref Range    Extra Tube hold for add-on    Gold Top - SST   Result Value Ref Range    Extra Tube Hold for add-ons.    Light Blue Top   Result Value Ref Range    Extra Tube Hold for add-ons.           ED Course  ED Course as of 08/17/23 1331   Thu Aug 17, 2023   1130 ECG 12 Lead Other; dizziness  Sinus rhythm anterior lateral infarct of undetermined age.  No acute ST deviations.  Rate 70  QRS 76 QTc 408  Electronically signed by Emily Monroe DO, 08/17/23, 11:41 AM EDT.   [LK]   1222 Patient refused IV and IV fluids at this time.  [MM]   1303 XR Chest 1 View  IMPRESSION:    Unremarkable exam. No acute cardiopulmonary findings identified.     This report was finalized on 8/17/2023 12:51 PM by Dr. James Reynoso MD   [MM]   1312 CT Head Without Contrast  IMPRESSION:    Unremarkable exam demonstrating no CT evidence of acute intracranial  findings.     This report was finalized on 8/17/2023 1:09 PM by Dr. James Reynoso MD   [MM]   1329 Patient diagnosed with dizziness. Will be d/c home with instructions to drink lots of water. Will f/u with PCP in 2 days or return to ER if symptoms worsen.  [MM]      ED Course User Index  [LK] Emily Monroe DO  [MM] Caroilna Tim PA                                           Medical Decision Making    This is a 23 year old female patient who presents to the ER with chief complaint of dizziness. PMH significant for migraines. For the past 3 days, the patient has had intermittent episodes of head spinning dizziness. She denies associated headache, numbness/tingling, chest pain and SOB.     Problems Addressed:  Dizziness: complicated acute illness or injury    Amount and/or Complexity of Data Reviewed  Labs: ordered. Decision-making details documented in ED Course.  Radiology: ordered. Decision-making details documented in ED Course.  ECG/medicine tests: ordered.  Decision-making details documented in ED Course.    Risk  Prescription drug management.        Final diagnoses:   Dizziness       ED Disposition  ED Disposition       ED Disposition   Discharge    Condition   Stable    Comment   --               Dylan Bojorquez PA  [address] [id number] [phone]    In 2 days           Medication List        Stop      amoxicillin-clavulanate 875-125 MG per tablet  Commonly known as: AUGMENTIN     ibuprofen 600 MG tablet  Commonly known as: ADVIL,MOTRIN     triamcinolone 0.1 % cream  Commonly known as: Carolina Mart PA  08/17/23 9901

## 2023-08-17 NOTE — ED NOTES
MEDICAL SCREENING:    Reason for Visit: intermittent dizziness since yesterday, hx of migraines    Patient initially seen in triage.  The patient was advised further evaluation and diagnostic testing will be needed, some of the treatment and testing will be initiated in the lobby in order to begin the process.  The patient will be returned to the waiting area for the time being and possibly be re-assessed by a subsequent ED provider.  The patient will be brought back to the treatment area in as timely manner as possible.      Keke Felipe, PA  08/17/23 1114